# Patient Record
Sex: MALE | Race: WHITE | Employment: FULL TIME | URBAN - METROPOLITAN AREA
[De-identification: names, ages, dates, MRNs, and addresses within clinical notes are randomized per-mention and may not be internally consistent; named-entity substitution may affect disease eponyms.]

---

## 2023-06-29 ENCOUNTER — OFFICE VISIT (OUTPATIENT)
Facility: LOCATION | Age: 38
End: 2023-06-29
Payer: COMMERCIAL

## 2023-06-29 VITALS — TEMPERATURE: 98 F | HEART RATE: 89 BPM

## 2023-06-29 DIAGNOSIS — K64.2 PROLAPSED INTERNAL HEMORRHOIDS, GRADE 3: ICD-10-CM

## 2023-06-29 DIAGNOSIS — K60.0 ACUTE POSTERIOR ANAL FISSURE: Primary | ICD-10-CM

## 2023-06-29 DIAGNOSIS — K62.89 HYPERTROPHY OF ANAL PAPILLAE: ICD-10-CM

## 2023-06-29 DIAGNOSIS — L29.0 PRURITUS ANI: ICD-10-CM

## 2023-06-29 PROCEDURE — 99203 OFFICE O/P NEW LOW 30 MIN: CPT | Performed by: COLON & RECTAL SURGERY

## 2023-06-29 PROCEDURE — 46600 DIAGNOSTIC ANOSCOPY SPX: CPT | Performed by: COLON & RECTAL SURGERY

## 2023-06-30 PROBLEM — K64.2 PROLAPSED INTERNAL HEMORRHOIDS, GRADE 3: Status: ACTIVE | Noted: 2023-06-30

## 2023-06-30 PROBLEM — K60.0 ACUTE POSTERIOR ANAL FISSURE: Status: ACTIVE | Noted: 2023-06-30

## 2023-06-30 PROBLEM — L29.0 PRURITUS ANI: Status: ACTIVE | Noted: 2023-06-30

## 2023-06-30 PROBLEM — K62.89 HYPERTROPHY OF ANAL PAPILLAE: Status: ACTIVE | Noted: 2023-06-30

## 2023-08-07 ENCOUNTER — PATIENT MESSAGE (OUTPATIENT)
Facility: LOCATION | Age: 38
End: 2023-08-07

## 2023-08-22 ENCOUNTER — OFFICE VISIT (OUTPATIENT)
Facility: LOCATION | Age: 38
End: 2023-08-22
Payer: COMMERCIAL

## 2023-08-22 VITALS — TEMPERATURE: 98 F | HEART RATE: 75 BPM

## 2023-08-22 DIAGNOSIS — K60.0 ACUTE POSTERIOR ANAL FISSURE: Primary | ICD-10-CM

## 2023-08-22 DIAGNOSIS — L29.0 PRURITUS ANI: ICD-10-CM

## 2023-08-22 PROCEDURE — 99213 OFFICE O/P EST LOW 20 MIN: CPT

## 2023-08-31 ENCOUNTER — TELEPHONE (OUTPATIENT)
Facility: LOCATION | Age: 38
End: 2023-08-31

## 2023-08-31 ENCOUNTER — OFFICE VISIT (OUTPATIENT)
Facility: LOCATION | Age: 38
End: 2023-08-31
Payer: COMMERCIAL

## 2023-08-31 VITALS — HEART RATE: 74 BPM | TEMPERATURE: 98 F

## 2023-08-31 DIAGNOSIS — K60.2 ANAL FISSURE: Primary | ICD-10-CM

## 2023-08-31 RX ORDER — IBUPROFEN 600 MG/1
600 TABLET ORAL EVERY 6 HOURS PRN
COMMUNITY

## 2023-09-05 ENCOUNTER — HOSPITAL ENCOUNTER (OUTPATIENT)
Facility: HOSPITAL | Age: 38
Setting detail: HOSPITAL OUTPATIENT SURGERY
Discharge: HOME | End: 2023-09-05
Attending: STUDENT IN AN ORGANIZED HEALTH CARE EDUCATION/TRAINING PROGRAM | Admitting: STUDENT IN AN ORGANIZED HEALTH CARE EDUCATION/TRAINING PROGRAM
Payer: COMMERCIAL

## 2023-09-05 ENCOUNTER — ANESTHESIA (OUTPATIENT)
Dept: SURGERY | Facility: HOSPITAL | Age: 38
End: 2023-09-05
Payer: COMMERCIAL

## 2023-09-05 ENCOUNTER — TELEPHONE (OUTPATIENT)
Facility: LOCATION | Age: 38
End: 2023-09-05

## 2023-09-05 ENCOUNTER — ANESTHESIA EVENT (OUTPATIENT)
Dept: SURGERY | Facility: HOSPITAL | Age: 38
End: 2023-09-05
Payer: COMMERCIAL

## 2023-09-05 VITALS
BODY MASS INDEX: 32.23 KG/M2 | RESPIRATION RATE: 18 BRPM | SYSTOLIC BLOOD PRESSURE: 148 MMHG | HEIGHT: 71 IN | DIASTOLIC BLOOD PRESSURE: 95 MMHG | OXYGEN SATURATION: 98 % | HEART RATE: 63 BPM | TEMPERATURE: 98 F | WEIGHT: 230.19 LBS

## 2023-09-05 PROBLEM — K60.2 ANAL FISSURE: Status: ACTIVE | Noted: 2023-06-30

## 2023-09-05 PROCEDURE — 46080 SPHNCTROTMY ANAL DIV SPHNCTR: CPT | Performed by: STUDENT IN AN ORGANIZED HEALTH CARE EDUCATION/TRAINING PROGRAM

## 2023-09-05 PROCEDURE — 0D8R0ZZ DIVISION OF ANAL SPHINCTER, OPEN APPROACH: ICD-10-PCS | Performed by: STUDENT IN AN ORGANIZED HEALTH CARE EDUCATION/TRAINING PROGRAM

## 2023-09-05 RX ORDER — SODIUM CHLORIDE, SODIUM LACTATE, POTASSIUM CHLORIDE, CALCIUM CHLORIDE 600; 310; 30; 20 MG/100ML; MG/100ML; MG/100ML; MG/100ML
INJECTION, SOLUTION INTRAVENOUS CONTINUOUS
Status: DISCONTINUED | OUTPATIENT
Start: 2023-09-05 | End: 2023-09-05

## 2023-09-05 RX ORDER — KETOROLAC TROMETHAMINE 30 MG/ML
INJECTION, SOLUTION INTRAMUSCULAR; INTRAVENOUS AS NEEDED
Status: DISCONTINUED | OUTPATIENT
Start: 2023-09-05 | End: 2023-09-05 | Stop reason: SURG

## 2023-09-05 RX ORDER — HYDROMORPHONE HYDROCHLORIDE 1 MG/ML
0.6 INJECTION, SOLUTION INTRAMUSCULAR; INTRAVENOUS; SUBCUTANEOUS EVERY 5 MIN PRN
Status: DISCONTINUED | OUTPATIENT
Start: 2023-09-05 | End: 2023-09-05

## 2023-09-05 RX ORDER — CEFOXITIN 2 G/1
INJECTION, POWDER, FOR SOLUTION INTRAVENOUS AS NEEDED
Status: DISCONTINUED | OUTPATIENT
Start: 2023-09-05 | End: 2023-09-05 | Stop reason: SURG

## 2023-09-05 RX ORDER — HYDROMORPHONE HYDROCHLORIDE 1 MG/ML
0.2 INJECTION, SOLUTION INTRAMUSCULAR; INTRAVENOUS; SUBCUTANEOUS EVERY 5 MIN PRN
Status: DISCONTINUED | OUTPATIENT
Start: 2023-09-05 | End: 2023-09-05

## 2023-09-05 RX ORDER — BUPIVACAINE HYDROCHLORIDE AND EPINEPHRINE 5; 5 MG/ML; UG/ML
INJECTION, SOLUTION EPIDURAL; INTRACAUDAL; PERINEURAL AS NEEDED
Status: DISCONTINUED | OUTPATIENT
Start: 2023-09-05 | End: 2023-09-05 | Stop reason: HOSPADM

## 2023-09-05 RX ORDER — ACETAMINOPHEN 500 MG
1000 TABLET ORAL ONCE
Status: DISCONTINUED | OUTPATIENT
Start: 2023-09-05 | End: 2023-09-05 | Stop reason: HOSPADM

## 2023-09-05 RX ORDER — LIDOCAINE HYDROCHLORIDE 10 MG/ML
INJECTION, SOLUTION EPIDURAL; INFILTRATION; INTRACAUDAL; PERINEURAL AS NEEDED
Status: DISCONTINUED | OUTPATIENT
Start: 2023-09-05 | End: 2023-09-05 | Stop reason: SURG

## 2023-09-05 RX ORDER — HYDROCODONE BITARTRATE AND ACETAMINOPHEN 5; 325 MG/1; MG/1
1 TABLET ORAL EVERY 6 HOURS PRN
Qty: 12 TABLET | Refills: 0 | Status: SHIPPED | OUTPATIENT
Start: 2023-09-05

## 2023-09-05 RX ORDER — NALOXONE HYDROCHLORIDE 0.4 MG/ML
0.08 INJECTION, SOLUTION INTRAMUSCULAR; INTRAVENOUS; SUBCUTANEOUS AS NEEDED
Status: DISCONTINUED | OUTPATIENT
Start: 2023-09-05 | End: 2023-09-05

## 2023-09-05 RX ORDER — ROCURONIUM BROMIDE 10 MG/ML
INJECTION, SOLUTION INTRAVENOUS AS NEEDED
Status: DISCONTINUED | OUTPATIENT
Start: 2023-09-05 | End: 2023-09-05 | Stop reason: SURG

## 2023-09-05 RX ORDER — HYDROCODONE BITARTRATE AND ACETAMINOPHEN 5; 325 MG/1; MG/1
2 TABLET ORAL ONCE AS NEEDED
Status: DISCONTINUED | OUTPATIENT
Start: 2023-09-05 | End: 2023-09-05

## 2023-09-05 RX ORDER — HYDROMORPHONE HYDROCHLORIDE 1 MG/ML
0.4 INJECTION, SOLUTION INTRAMUSCULAR; INTRAVENOUS; SUBCUTANEOUS EVERY 5 MIN PRN
Status: DISCONTINUED | OUTPATIENT
Start: 2023-09-05 | End: 2023-09-05

## 2023-09-05 RX ORDER — DEXAMETHASONE SODIUM PHOSPHATE 4 MG/ML
VIAL (ML) INJECTION AS NEEDED
Status: DISCONTINUED | OUTPATIENT
Start: 2023-09-05 | End: 2023-09-05 | Stop reason: SURG

## 2023-09-05 RX ORDER — ACETAMINOPHEN 500 MG
1000 TABLET ORAL ONCE AS NEEDED
Status: DISCONTINUED | OUTPATIENT
Start: 2023-09-05 | End: 2023-09-05

## 2023-09-05 RX ORDER — ONDANSETRON 2 MG/ML
INJECTION INTRAMUSCULAR; INTRAVENOUS AS NEEDED
Status: DISCONTINUED | OUTPATIENT
Start: 2023-09-05 | End: 2023-09-05 | Stop reason: SURG

## 2023-09-05 RX ORDER — HYDROCODONE BITARTRATE AND ACETAMINOPHEN 5; 325 MG/1; MG/1
1 TABLET ORAL ONCE AS NEEDED
Status: DISCONTINUED | OUTPATIENT
Start: 2023-09-05 | End: 2023-09-05

## 2023-09-05 RX ORDER — SCOLOPAMINE TRANSDERMAL SYSTEM 1 MG/1
1 PATCH, EXTENDED RELEASE TRANSDERMAL ONCE
Status: DISCONTINUED | OUTPATIENT
Start: 2023-09-05 | End: 2023-09-05 | Stop reason: HOSPADM

## 2023-09-05 RX ADMIN — CEFOXITIN 2 G: 2 INJECTION, POWDER, FOR SOLUTION INTRAVENOUS at 13:30:00

## 2023-09-05 RX ADMIN — KETOROLAC TROMETHAMINE 30 MG: 30 INJECTION, SOLUTION INTRAMUSCULAR; INTRAVENOUS at 13:58:00

## 2023-09-05 RX ADMIN — ONDANSETRON 4 MG: 2 INJECTION INTRAMUSCULAR; INTRAVENOUS at 13:58:00

## 2023-09-05 RX ADMIN — LIDOCAINE HYDROCHLORIDE 50 MG: 10 INJECTION, SOLUTION EPIDURAL; INFILTRATION; INTRACAUDAL; PERINEURAL at 13:13:00

## 2023-09-05 RX ADMIN — DEXAMETHASONE SODIUM PHOSPHATE 4 MG: 4 MG/ML VIAL (ML) INJECTION at 13:50:00

## 2023-09-05 RX ADMIN — ROCURONIUM BROMIDE 50 MG: 10 INJECTION, SOLUTION INTRAVENOUS at 13:13:00

## 2023-09-05 RX ADMIN — SODIUM CHLORIDE, SODIUM LACTATE, POTASSIUM CHLORIDE, CALCIUM CHLORIDE: 600; 310; 30; 20 INJECTION, SOLUTION INTRAVENOUS at 13:06:00

## 2023-09-05 NOTE — ANESTHESIA POSTPROCEDURE EVALUATION
[address] Patient Status:  Hospital Outpatient Surgery   Age/Gender 44year old male MRN [id number]   [facility] SURGERY Attending Dyana Tatum MD   [address] Day # 0 PCP Valentín Medina MD       Anesthesia Post-op Note    ANAL EXAMINATION UNDER ANESTHESIA, LATERAL INTERNAL SPHINCTEROTOMY, ANOSCOPY    Procedure Summary       Date: 09/05/23 Room / Location: [facility] MAIN OR 04 / [facility] MAIN OR    Anesthesia Start: 4754 Anesthesia Stop: 6078    Procedure: ANAL EXAMINATION UNDER ANESTHESIA, LATERAL INTERNAL SPHINCTEROTOMY, ANOSCOPY (Anus) Diagnosis:       Anal fissure      (Anal fissure [K60.2])    Surgeons: Dyana Tatum MD Anesthesiologist: Litzy Andrea MD    Anesthesia Type: general ASA Status: 2            Anesthesia Type: general    Vitals Value Taken Time   /90 09/05/23 1421   Temp 97.2 09/05/23 1425   Pulse 81 09/05/23 1424   Resp 21 09/05/23 1424   SpO2 100 % 09/05/23 1424   Vitals shown include unvalidated device data. Patient Location: PACU    Anesthesia Type: general    Airway Patency: patent, extubated and oral/nasal airway    Postop Pain Control: adequate    Nausea/Vomiting: none    Cardiopulmonary/Hydration status: stable euvolemic    Complications: no apparent anesthesia related complications    Postop vital signs: stable    Dental Exam: Unchanged from Preop    Patient to be discharged from PACU when criteria met.

## 2023-09-05 NOTE — OPERATIVE REPORT
[facility]  Operative Note    Leilani Cook [facility] Location: OR   [facility] [id number] MRN [id number]    1985 Age 44year old   Admission Date 2023 Operation Date 2023   Attending Physician Fela Alcantar MD Operating Physician Vicki Flynn MD   PCP Virgil Sun MD          Patient Name: Cristy Rajput III    Preoperative Diagnosis: Anal fissure [K60.2]    Postoperative Diagnosis: Anal fissure, internal hemorrhoids    Primary Surgeon: Vicki Flynn MD    Assistant: Luiza ROSSI    Anesthesia: General    Procedures: Anal exam under anesthesia with anoscopy, lateral internal sphincterotomy    Implants: None    Specimen: None    Drains: None    Estimated Blood Loss: 5 cc    Complications: None immediate    Condition: Stable    Indications for Surgery: This is a very nice 74-year-old gentleman who was previously seeing Dr. Antonio Larsen in regards to an anal fissure and pruritus ani. His pruritus ani symptoms have improved. However, he continues to have life-limiting, severe anorectal pain despite using nitroglycerin ointment 2-3 times a day. He now complains of constant pain over the last 2 weeks. He has had to miss 4 days of work due to the pain. He continues to have occasional bleeding after bowel movements. Bedside exam reveals the presence of a persistent, small posterior midline anal fissure. I recommend proceeding to the operating room for anal exam under anesthesia with anoscopy and lateral internal sphincterotomy. The details of this procedure were discussed including the expected recovery time, risks, benefits and alternatives. This procedure has a 97% success rate and healing anal fissures. There is small risk of bleeding, infection, urinary retention, fecal incontinence. Patient expressed understanding and wished to proceed with surgery as scheduled today. Consent was signed. All questions answered.      Surgical Findings:   Small posterior midline anal fissure (< 1 cm in length) that was friable and bled on contact. Hypertonic sphincter tone. Grade 2 internal hemorrhoids. Description of Procedure:   Patient was brought to the operating room on the transport cart. Bilateral sequential compression devices were placed. Preoperative antibiotics were given. Patient was induced under general endotracheal anesthesia. Patient was then carefully flipped prone onto the OR table with all pressure points well-padded. Patient was positioned in prone jackknife with the buttocks retracted apart with silk tape. The anus and perianal skin were prepped and draped in the usual sterile fashion. A timeout was performed. I began with external exam of the anus and digital rectal exam. Gentle spreading of the anoderm also revealed an obvious posterior midline anal fissure measuring <1 cm in length that bled on contact. Digital rectal exam showed hypertonic sphincter tone. A Concepcion bivalve anoscope was used to circumferentially examine the anal canal. There were fairly large grade 2-3 left lateral, right anterior and right posterior internal hemorrhoids. I decided to proceed with right lateral internal sphincterotomy. A Concepcion bivalve anoscope was used to put the sphincter muscles under stretch. I was able to easily identify the hypertonic internal anal sphincter in the right lateral intersphincteric groove. A 7 mm mucosal incision was made in the intersphincteric groove. I dissected out the internal sphincter with Metzenbaum scissors. The internal sphincter was sharply divided using the Metzenbaum scissors. Hemostasis was achieved within the wound bed using electrocautery. The mucosal defect was reapproximated using an interrupted 3-0 Vicryl suture. The anal canal was irrigated, suctioned and appeared hemostatic. Digital rectal exam was repeated and showed significant relaxation of the sphincter tone.      30 cc of 0.5% Marcaine with epinephrine was injected circumferentially around the anus as a local anesthetic. The skin was cleaned and dried and a dressing of 4 x 4 gauze, ABD pad and paper tape was applied. Patient was carefully flipped back supine onto the transport cart, awakened from anesthesia, extubated and transferred to the postanesthesia care unit in stable condition. All sponge, needle and instrument counts were correct at the end of the case. I was present for the entire case.     Zoraida Soto MD  9/5/2023  2:20 PM

## 2023-09-05 NOTE — ANESTHESIA PROCEDURE NOTES
Airway  Date/Time: 9/5/2023 1:15 PM  Urgency: elective      General Information and Staff    Patient location during procedure: OR  Anesthesiologist: Shirley Woods MD  Performed: anesthesiologist   Performed by: Shirley Woods MD  Authorized by: Shirley Woods MD      Indications and Patient Condition  Indications for airway management: anesthesia  Sedation level: deep  Preoxygenated: yes  Patient position: sniffing  Mask difficulty assessment: 1 - vent by mask    Final Airway Details  Final airway type: endotracheal airway      Successful airway: ETT  Cuffed: yes   Successful intubation technique: direct laryngoscopy  Endotracheal tube insertion site: oral  Blade size: #3  ETT size (mm): 8.0    Cormack-Lehane Classification: grade IIA - partial view of glottis  Placement verified by: capnometry   Measured from: lips  ETT to lips (cm): 24  Number of attempts at approach: 1

## 2023-09-05 NOTE — INTERVAL H&P NOTE
Pre-op Diagnosis: Anal fissure [K60.2]    The above referenced H&P was reviewed by Denny Yang MD on 9/5/2023, the patient was examined and no significant changes have occurred in the patient's condition since the H&P was performed. I discussed with the patient and/or legal representative the potential benefits, risks and side effects of this procedure; the likelihood of the patient achieving goals; and potential problems that might occur during recuperation. I discussed reasonable alternatives to the procedure, including risks, benefits and side effects related to the alternatives and risks related to not receiving this procedure. We will proceed with procedure as planned.

## 2023-09-06 NOTE — TELEPHONE ENCOUNTER
Returned pt call, pt Gogo Wu had called him. Informed pt we just received his forms yesterday(9/5/23) informed pt of 10-15 business day turn around time. Pt verbalized understanding. Pt stts forms to start 08/31/23-pending re-eval not yet scheduled.

## 2023-09-19 ENCOUNTER — PATIENT MESSAGE (OUTPATIENT)
Facility: LOCATION | Age: 38
End: 2023-09-19

## 2023-09-19 NOTE — TELEPHONE ENCOUNTER
Received call from pt, pt has questions regarding an invoice. Advised pt to call billing department as we have no info on billing. Phone number given, pt verbalized understanding.

## 2023-09-20 NOTE — TELEPHONE ENCOUNTER
Dr. Elisha Summers,     Please sign off on form if you agree to: Disability due to rectal pain/outpatient surgery, 8/31/23 - 9/23/23 with restrictions    (Please place your signature on the first page only)    -From your Inbasket, Highlight the patient and click Chart   -Double click the 9/3/04 Forms Completion telephone encounter  -Scroll down to the Media section   -Click the blue Hyperlink: Disab Dr Elisha Summers [birth year]  -Click Acknowledge located at the bottom right corner (if you do not see acknowledge, try maximizing your window)   -Drag the mouse into the blank space of the document and a + sign will appear. Left click to   electronically sign the document.      Thank you,    Anastasiia Ambrosio

## 2023-09-20 NOTE — TELEPHONE ENCOUNTER
From: Geovanna Laughlin III  To: Jt Lam  Sent: 9/19/2023 8:38 PM CDT  Subject: Work release letter    Can I please get a release letter with no restrictions so I can go back to work?

## 2023-10-02 ENCOUNTER — OFFICE VISIT (OUTPATIENT)
Facility: LOCATION | Age: 38
End: 2023-10-02

## 2023-10-02 VITALS — HEART RATE: 62 BPM | TEMPERATURE: 98 F

## 2023-10-02 DIAGNOSIS — K64.2 PROLAPSED INTERNAL HEMORRHOIDS, GRADE 3: ICD-10-CM

## 2023-10-02 DIAGNOSIS — K60.2 ANAL FISSURE: Primary | ICD-10-CM

## 2023-10-02 PROCEDURE — 99024 POSTOP FOLLOW-UP VISIT: CPT | Performed by: STUDENT IN AN ORGANIZED HEALTH CARE EDUCATION/TRAINING PROGRAM

## (undated) DEVICE — RECTAL CDS-LF: Brand: MEDLINE INDUSTRIES, INC.

## (undated) DEVICE — SOL PREP 4OZ 10% POVIDONE IOD

## (undated) DEVICE — UNDYED BRAIDED (POLYGLACTIN 910), SYNTHETIC ABSORBABLE SUTURE: Brand: COATED VICRYL

## (undated) DEVICE — SLEEVE KENDALL SCD EXPRESS MED

## (undated) DEVICE — STERILE POLYISOPRENE POWDER-FREE SURGICAL GLOVES: Brand: PROTEXIS

## (undated) DEVICE — SOL NACL IRRIG 0.9% 1000ML BTL

## (undated) DEVICE — #11 STERILE BLADE: Brand: POLYMER COATED BLADES

## (undated) DEVICE — PENCIL TELESCOPE MEGADYNE SE

## (undated) DEVICE — UNDERPANTS MAT 2XL FOR 24-64IN

## (undated) DEVICE — GAUZE STERILE 4X4 12PLY

## (undated) NOTE — LETTER
2023    Return to Work    Name: Daquan Cruz III        : 1985    To Whom It May Concern,    [facility] had surgery on 2023 and is:    Able to return to school / work with restrictions:  No lifting over: 20 lbs until 10/3/2023. The patient may return to work on 2023. If there are any further questions, regarding this patient's care, please contact the patient directly.     Sincerely,    Cherylene Mace PA-C

## (undated) NOTE — LETTER
Date: 9/5/2023    Patient Name: Toro Merida III          To Whom it may concern: This letter has been written at the patient's request. The above patient was seen at the [facility] for treatment of a medical condition. Pratibha Showers was needed to be in attendance for this patient     This patient family should be excused from attending work/school from 9/5/2023 through 9/6/2023.           Sincerely,    [facility] Staff RN

## (undated) NOTE — LETTER
10/2/2023    Return to work    Name: Geovanna Laughlin III        : 1985    To Whom It May Concern,    [facility] III had surgery on 2023 and is:    Able to return to school / work without restrictions on 10/3/2023. If there are any further questions, regarding this patient's care, please contact the patient directly.     Sincerely,      Catia Fleming MD

## (undated) NOTE — LETTER
Date: 9/5/2023    Patient Name: Daquan Cruz III          To Whom it may concern: This letter has been written at the patient's request. The above patient was seen at the [facility] for treatment of a medical condition. Family was needed to be in attendance for this patient. This patient's family should be excused from attending work/school from 9/5/2023 through 9/6/2023.             Sincerely,    Linda Chavez